# Patient Record
Sex: FEMALE | Race: BLACK OR AFRICAN AMERICAN
[De-identification: names, ages, dates, MRNs, and addresses within clinical notes are randomized per-mention and may not be internally consistent; named-entity substitution may affect disease eponyms.]

---

## 2018-09-16 ENCOUNTER — HOSPITAL ENCOUNTER (EMERGENCY)
Dept: HOSPITAL 74 - FER | Age: 68
Discharge: HOME | End: 2018-09-16
Payer: COMMERCIAL

## 2018-09-16 VITALS — SYSTOLIC BLOOD PRESSURE: 151 MMHG | TEMPERATURE: 98.8 F | DIASTOLIC BLOOD PRESSURE: 67 MMHG | HEART RATE: 78 BPM

## 2018-09-16 VITALS — BODY MASS INDEX: 29.9 KG/M2

## 2018-09-16 DIAGNOSIS — Z85.3: ICD-10-CM

## 2018-09-16 DIAGNOSIS — J44.1: Primary | ICD-10-CM

## 2018-09-16 DIAGNOSIS — I10: ICD-10-CM

## 2018-09-16 DIAGNOSIS — Z95.5: ICD-10-CM

## 2018-09-16 DIAGNOSIS — I25.10: ICD-10-CM

## 2018-09-16 PROCEDURE — 3E0F7GC INTRODUCTION OF OTHER THERAPEUTIC SUBSTANCE INTO RESPIRATORY TRACT, VIA NATURAL OR ARTIFICIAL OPENING: ICD-10-PCS

## 2018-09-16 RX ADMIN — IPRATROPIUM BROMIDE AND ALBUTEROL SULFATE SCH AMP: .5; 3 SOLUTION RESPIRATORY (INHALATION) at 11:09

## 2018-09-16 RX ADMIN — IPRATROPIUM BROMIDE AND ALBUTEROL SULFATE SCH AMP: .5; 3 SOLUTION RESPIRATORY (INHALATION) at 12:50

## 2018-09-16 RX ADMIN — IPRATROPIUM BROMIDE AND ALBUTEROL SULFATE SCH AMP: .5; 3 SOLUTION RESPIRATORY (INHALATION) at 11:45

## 2018-09-16 RX ADMIN — IPRATROPIUM BROMIDE AND ALBUTEROL SULFATE SCH AMP: .5; 3 SOLUTION RESPIRATORY (INHALATION) at 12:35

## 2018-09-16 NOTE — PDOC
History of Present Illness





- General


Chief Complaint: Respiratory


Stated Complaint: SOB, COUGH


Time Seen by Provider: 09/16/18 10:25


History Source: Patient


Exam Limitations: No Limitations





- History of Present Illness


Initial Comments: 





67 yo F history COPD, HTN, CAD presents with SOB for past 2 days. She states 

she has been taking her nebulizer without relief. She also states that weather 

changes typically prompt her symptoms, and the weather has recently become much 

cooler. She has had a dry cough for the past week, no fever, no cp. She has had 

similar symptoms in the past which have required prednisone. 








Past History





- Past Medical History


Allergies/Adverse Reactions: 


 Allergies











Allergy/AdvReac Type Severity Reaction Status Date / Time


 


No Known Allergies Allergy   Verified 09/16/18 10:24











Home Medications: 


Ambulatory Orders





Albuterol Sulfate Inhaler - [Ventolin Hfa Inhaler -] 1 - 2 inh PO QID PRN 09/16/ 18 


Anastrozole [Arimidex] 1 mg PO DAILY 09/16/18 


Aspirin [Aspirin EC] 81 mg PO DAILY 09/16/18 


Atorvastatin Ca [Lipitor] 80 mg PO HS 09/16/18 


Azithromycin [Zithromax 250mg Tablets -] 250 mg PO UTDICT #6 tab 09/16/18 


Clopidogrel Bisulfate [Plavix] 75 mg PO DAILY 09/16/18 


Diltiazem HCl [Diltiazem 24Hr ER] 180 mg PO DAILY 09/16/18 


Fluticasone/Salmeterol [Advair 250-50 Diskus] 1 each IH BID 09/16/18 


Furosemide 40 mg PO DAILY 09/16/18 


Metoprolol Tartrate [Lopressor] 50 mg PO BID 09/16/18 


Sennosides/Docusate Sodium [Stool Softener-Laxative Tablet] 1 each PO ASDIR 09/ 16/18 


predniSONE [Deltasone -] 40 mg PO DAILY #8 tablet 09/16/18 








Cancer: Yes (breast ca 8/2016)


COPD: Yes


HTN: Yes





- Surgical History


Cardiac Surgery: Yes (cardiac stents, open heart sx 10/2017, [facility])





- Suicide/Smoking/Psychosocial Hx


Smoking History: Never smoked


Have you smoked in the past 12 months: No


Information on smoking cessation initiated: No


Hx Alcohol Use:  (occasional)





**Review of Systems





- Review of Systems


Able to Perform ROS?: Yes


Comments:: 





GENERAL/CONSTITUTIONAL: No fever or chills. No weakness.


HEAD, EYES, EARS, NOSE AND THROAT: No change in vision. No ear pain or 

discharge. No sore throat.


CARDIOVASCULAR: No chest pain. No shortness of breath.


RESPIRATORY: +Cough and wheezing. No hemoptysis.


GASTROINTESTINAL: No nausea, vomiting, diarrhea or constipation.


GENITOURINARY: No dysuria, frequency, or change in urination.


MUSCULOSKELETAL: No joint or muscle swelling or pain. No neck or back pain.


SKIN: No rash


NEUROLOGIC: No headache, vertigo, loss of consciousness, or change in strength/

sensation.


ENDOCRINE: No increased thirst. No abnormal weight change.


HEMATOLOGIC/LYMPHATIC: No anemia, easy bleeding, or history of blood clots.


ALLERGIC/IMMUNOLOGIC: No hives or skin allergy.








*Physical Exam





- Vital Signs


 Last Vital Signs











Temp Pulse Resp BP Pulse Ox


 


 98.8 F   78   22   151/67   96 


 


 09/16/18 10:23  09/16/18 10:23  09/16/18 10:23  09/16/18 10:23  09/16/18 10:23














- Physical Exam


Comments: 





GENERAL: Awake, alert, and fully oriented, in no acute distress


HEAD: No signs of trauma


EYES: PERRLA, EOMI, sclera anicteric, conjunctiva clear


ENT: Auricles normal inspection, hearing grossly normal, nares patent, 

oropharynx clear without exudates. Moist mucosa


NECK: Normal ROM, supple, no lymphadenopathy, JVD, or masses


LUNGS: Dec air entry B/L, diffuse exp wheezes. Intermittent dry cough. 

Prolonged expiratory phase. Speaking full sentences.


HEART: Regular rate and rhythm, normal S1 and S2, no murmurs, rubs or gallops


ABDOMEN: Soft, nontender, normoactive bowel sounds.  No guarding, no rebound.  

No masses


EXTREMITIES: Normal range of motion, no edema.  No clubbing or cyanosis. No 

cords, erythema, or tenderness


NEUROLOGICAL: Cranial nerves II through XII grossly intact.  Normal speech, 

normal gait


SKIN: Warm, Dry, normal turgor, no rashes or lesions noted.








Medical Decision Making





- Medical Decision Making





09/16/18 11:16


Pt with COPD exacerbation, likely due to recent weather change. She is not in 

distress and speaking full sentences. Will give nebs, steroids. Will obtain CXR 

to r/o pna.





09/16/18 12:33


Pt reassessed. She is s/p 2 treatments, still wheezing, but able to ambulate to 

the bathroom without difficulty. 





09/16/18 13:42


Pt completed the remaining 2 treatments, reports feeling much better. CXR 

reviewed, no consolidation. Will give a course of steroids and abx (recent 

cough with inc sputum production). 








*DC/Admit/Observation/Transfer


Diagnosis at time of Disposition: 


 COPD exacerbation








- Discharge Dispostion


Disposition: HOME


Condition at time of disposition: Stable


Decision to Admit order: No





- Prescriptions


Prescriptions: 


Azithromycin [Zithromax 250mg Tablets -] 250 mg PO UTDICT #6 tab


predniSONE [Deltasone -] 40 mg PO DAILY #8 tablet





- Referrals


Referrals: 


Kelsey Quintana MD [Primary Care Provider] - 





- Patient Instructions


Printed Discharge Instructions:  [facility] for Chronic Obstructive Pulmonary Disease, 

[facility] for Acute Bronchitis





- Post Discharge Activity